# Patient Record
Sex: MALE | Race: BLACK OR AFRICAN AMERICAN
[De-identification: names, ages, dates, MRNs, and addresses within clinical notes are randomized per-mention and may not be internally consistent; named-entity substitution may affect disease eponyms.]

---

## 2017-03-21 ENCOUNTER — HOSPITAL ENCOUNTER (EMERGENCY)
Dept: HOSPITAL 62 - ER | Age: 72
LOS: 1 days | Discharge: HOME | End: 2017-03-22
Payer: MEDICARE

## 2017-03-21 VITALS — SYSTOLIC BLOOD PRESSURE: 145 MMHG | DIASTOLIC BLOOD PRESSURE: 64 MMHG

## 2017-03-21 DIAGNOSIS — I10: ICD-10-CM

## 2017-03-21 DIAGNOSIS — Z87.891: ICD-10-CM

## 2017-03-21 DIAGNOSIS — J06.9: Primary | ICD-10-CM

## 2017-03-21 DIAGNOSIS — R50.9: ICD-10-CM

## 2017-03-21 PROCEDURE — 87804 INFLUENZA ASSAY W/OPTIC: CPT

## 2017-03-21 PROCEDURE — 71020: CPT

## 2017-03-21 PROCEDURE — 99284 EMERGENCY DEPT VISIT MOD MDM: CPT

## 2017-03-22 NOTE — ER DOCUMENT REPORT
ED General





- General


Chief Complaint: Flu Symptoms


Stated Complaint: FLU LIKE SYMPTOMS


Notes: 


Patient is a 71-year-old male with past history of lung cancer in remission, 

not currently on any chemotherapy who presents with 12 hours of cough, sinus 

congestion, rhinorrhea, pressure over the forehead.  Multiple sick contacts 

with similar illness.  Patient denies any recent history of similar symptoms.  

He did have a partial pneumonectomy of the left lung back in October 2016.  He 

has not seen his primary care doctor regarding today's concerns.  He did 

receive an influenza vaccination this year.  He has not had any associated 

vomiting, diarrhea, neck pain or altered mental status.  He has not done 

anything to try to treat his symptoms.  Nothing worsens his symptoms.


TRAVEL OUTSIDE OF THE [place] IN LAST 30 DAYS: No





- Related Data


Allergies/Adverse Reactions: 


 





No Known Allergies Allergy (Verified 11/29/16 17:59)


 











Past Medical History





- General


Information source: Patient





- Social History


Smoking Status: Former Smoker


Chew tobacco use (# tins/day): No


Frequency of alcohol use: None


Drug Abuse: None


Lives with: Spouse/Significant other


Family History: Reviewed & Not Pertinent


Patient has suicidal ideation: No


Patient has homicidal ideation: No





- Past Medical History


Cardiac Medical History: Reports: Hx Hypertension


Renal/ Medical History: Denies: Hx Peritoneal Dialysis


Past Surgical History: Reports: Hx Inguinal Hernia





- Immunizations


Immunizations up to date: Yes


Hx Diphtheria, Pertussis, Tetanus Vaccination: Yes





Review of Systems





- Review of Systems


Notes: 


Constitutional: Positive for fever.


HENT: Negative for sore throat.


Eyes: Negative for visual changes.


Cardiovascular: Negative for chest pain.


Respiratory: Negative for shortness of breath.  Positive for cough


Gastrointestinal: Negative for abdominal pain, vomiting or diarrhea.


Genitourinary: Negative for dysuria.


Musculoskeletal: Negative for back pain.


Skin: Negative for rash.


Neurological: Negative for headaches, weakness or numbness.





10 point ROS negative except as marked above and in HPI.





Physical Exam





- Vital signs


Vitals: 


 











Temp Pulse Resp BP Pulse Ox


 


 100.7 F H  87   16   145/64 H  95 


 


 03/21/17 21:51  03/21/17 21:51  03/21/17 21:51  03/21/17 21:51  03/21/17 21:51











Interpretation: Febrile


Notes: 


PHYSICAL EXAMINATION:





GENERAL: Well-appearing, well-nourished and in no acute distress.





HEAD: Atraumatic, normocephalic.





EYES: Pupils equal round and reactive to light, extraocular movements intact, 

sclera anicteric, conjunctiva are normal.





ENT: nares patent, oropharynx clear without exudates.  Moist mucous membranes.





NECK: Normal range of motion, supple without lymphadenopathy





LUNGS: Breath sounds clear to auscultation bilaterally and equal.  No wheezes 

rales or rhonchi.





HEART: Regular rate and rhythm without murmurs





ABDOMEN: Soft, nontender, normoactive bowel sounds.  No guarding, no rebound.  

No masses appreciated.





EXTREMITIES: Normal range of motion, no pitting or edema.  No cyanosis.





NEUROLOGICAL: No focal neurological deficits. Moves all extremities 

spontaneously and on command.





PSYCH: Normal mood, normal affect.





SKIN: Warm, Dry, normal turgor, no rashes or lesions noted.





Course





- Re-evaluation


Re-evalutation: 


03/22/17 01:54


Patient presents with cough, sinus congestion, rhinorrhea and generalized 

fatigue.  He does have a fever here to 100.7 but vitals are otherwise within 

normal limits.  Multiple sick contacts with similar illness.  Presentation is 

most consistent with a likely viral upper respiratory illness.  Influenza 

testing is negative.  Chest x-ray without evidence of acute pneumonia.  No 

egophony on exam.  No diminished breath sounds.  At this point I do not suspect 

clinically an acute pneumonia as patient's respiratory rate is normal, pulse 

oximetry is within normal limits and he is not tachycardic.  Clinical history 

likewise is not consistent with acute pulmonary embolus, meningitis, and 

patient denies any vomiting, diarrhea, or abdominal pain to suggest an acute 

abdominal pathology as etiology of his presentation today.  I do not believe 

blood work or urinalysis are indicated at this time based on patient's overall 

very well appearance and normal vitals with exception of a low-grade fever.  At 

this time will discharge with return precautions and follow-up recommendations.

  Verbal discharge instructions given a the bedside and opportunity for 

questions given. Medication warnings reviewed. Patient is in agreement with 

this plan and has verbalized understanding of return precautions and the need 

for primary care follow-up in the next 24-72 hours.





- Vital Signs


Vital signs: 


 











Temp Pulse Resp BP Pulse Ox


 


 100.7 F H  87   16   145/64 H  95 


 


 03/21/17 21:51  03/21/17 21:51  03/21/17 21:51  03/21/17 21:51  03/21/17 21:51














- Diagnostic Test


Radiology reviewed: Image reviewed, Reports reviewed


Radiology results interpreted by me: 





03/22/17 01:57


Chest x-ray: No acute infiltrate





Discharge





- Discharge


Clinical Impression: 


Fever


Qualifiers:


 Fever type: unspecified Qualified Code(s): R50.9 - Fever, unspecified





Upper respiratory infection


Qualifiers:


 URI type: unspecified URI Qualified Code(s): J06.9 - Acute upper respiratory 

infection, unspecified





Condition: Good


Disposition: HOME, SELF-CARE


Additional Instructions: 


Please follow closely with her primary care doctor in the next 1-2 days.  

Return to the emergency department immediately if you have worsening of your 

symptoms including increased shortness of breath, vomiting, vomiting, you pass 

out, began to have abdominal pain, or have any other symptoms that are 

worrisome to you.  Take Tylenol or ibuprofen as needed at home for your 

symptoms.  You can use the Tessalon Perles that were prescribed as needed for 

severe coughing.


Prescriptions: 


Benzonatate [Tessalon Perle 100 mg Capsule] 100 mg PO Q8HP PRN #40 cap


 PRN Reason: 


Referrals: 


KRISTINA LIMON MD [Primary Care Provider] - Follow up tomorrow